# Patient Record
Sex: OTHER/UNKNOWN | URBAN - METROPOLITAN AREA
[De-identification: names, ages, dates, MRNs, and addresses within clinical notes are randomized per-mention and may not be internally consistent; named-entity substitution may affect disease eponyms.]

---

## 2023-02-01 ENCOUNTER — DOCUMENTATION ONLY (OUTPATIENT)
Dept: NEUROSURGERY | Facility: CLINIC | Age: 23
End: 2023-02-01

## 2023-02-01 DIAGNOSIS — D32.9 MENINGIOMA: Primary | ICD-10-CM

## 2023-05-23 DIAGNOSIS — M41.9 SCOLIOSIS, UNSPECIFIED SCOLIOSIS TYPE, UNSPECIFIED SPINAL REGION: ICD-10-CM

## 2023-05-23 DIAGNOSIS — D32.9 MENINGIOMA: Primary | ICD-10-CM

## 2023-06-08 DIAGNOSIS — M41.9 SCOLIOSIS, UNSPECIFIED SCOLIOSIS TYPE, UNSPECIFIED SPINAL REGION: Primary | ICD-10-CM

## 2025-03-17 ENCOUNTER — TELEPHONE (OUTPATIENT)
Facility: CLINIC | Age: 25
End: 2025-03-17

## 2025-03-18 ENCOUNTER — TELEPHONE (OUTPATIENT)
Facility: CLINIC | Age: 25
End: 2025-03-18

## 2025-03-26 ENCOUNTER — NURSE TRIAGE (OUTPATIENT)
Facility: CLINIC | Age: 25
End: 2025-03-26

## 2025-04-07 ENCOUNTER — TELEPHONE (OUTPATIENT)
Facility: CLINIC | Age: 25
End: 2025-04-07

## 2025-06-20 ENCOUNTER — TELEPHONE (OUTPATIENT)
Dept: NEUROSURGERY | Facility: CLINIC | Age: 25
End: 2025-06-20

## 2025-06-20 NOTE — TELEPHONE ENCOUNTER
"New Patient Visit Phone Call Prior to visit    Patient is being referred to {NSDOCS:75754} and is scheduled on *** at *** with {NSAPPS:29332}.    Patient Information:  Referred by: ***  Reason for referral: {NSREASONREFERRAL:43287}  Location of pain: *** Does it radiate?  Type of pain? {NSPAINTYPE:65978}  Rate pain:{NSPAINRATE:45471}  Is this accident related injury? {GSPainYesNone:97786::"Not pertinent"} {NSREASONPAIN:94808}  Is there an  involved or workman's comp case involved? {GSPainYesNone:46109::"Not pertinent"}       Previous Treatments:  Have you seen any other providers for these specific symptoms?: {GSPainYesNone:79491::"Not pertinent"}  If yes, which providers and what do they treat for?  *** {NSSPINESPECIALTIES:62957}   Surgery on spine or brain: {GSPainYesNone:84359::"Not pertinent"}    If yes, which provider and approx when? ***   ***  Any previous testing: {GSPainYesNone:92118::"Not pertinent"}   If yes, which facility?  Does patient need to bring a disc: {GSPainYesNone:82320::"Not pertinent"}      Medications  Current Medications for this pain: {NSSPINEMEDS:92996}. More than one medication? ***  PCP: ***  Cardiologist, if one: {GSPainYesNone:98590::"Not pertinent"}      Communications  Check-in time at  15 minutes prior to the visit time or may delay initial evaluation.  Please bring an updated list of all medications your are currently taking to your visit.      "

## 2025-06-25 ENCOUNTER — TELEPHONE (OUTPATIENT)
Dept: NEUROSURGERY | Facility: CLINIC | Age: 25
End: 2025-06-25

## 2025-07-11 ENCOUNTER — TELEPHONE (OUTPATIENT)
Dept: NEUROSURGERY | Facility: CLINIC | Age: 25
End: 2025-07-11